# Patient Record
Sex: MALE | Race: WHITE | NOT HISPANIC OR LATINO | Employment: FULL TIME | URBAN - METROPOLITAN AREA
[De-identification: names, ages, dates, MRNs, and addresses within clinical notes are randomized per-mention and may not be internally consistent; named-entity substitution may affect disease eponyms.]

---

## 2018-09-28 ENCOUNTER — OFFICE VISIT (OUTPATIENT)
Dept: FAMILY MEDICINE | Facility: CLINIC | Age: 33
End: 2018-09-28
Payer: COMMERCIAL

## 2018-09-28 VITALS
DIASTOLIC BLOOD PRESSURE: 73 MMHG | BODY MASS INDEX: 29.26 KG/M2 | HEIGHT: 71 IN | WEIGHT: 209 LBS | OXYGEN SATURATION: 98 % | TEMPERATURE: 97.8 F | HEART RATE: 63 BPM | RESPIRATION RATE: 14 BRPM | SYSTOLIC BLOOD PRESSURE: 122 MMHG

## 2018-09-28 DIAGNOSIS — Z00.00 ROUTINE GENERAL MEDICAL EXAMINATION AT A HEALTH CARE FACILITY: Primary | ICD-10-CM

## 2018-09-28 LAB
ANION GAP SERPL CALCULATED.3IONS-SCNC: 7 MMOL/L (ref 3–14)
BUN SERPL-MCNC: 11 MG/DL (ref 7–30)
CALCIUM SERPL-MCNC: 9.2 MG/DL (ref 8.5–10.1)
CHLORIDE SERPL-SCNC: 102 MMOL/L (ref 94–109)
CHOLEST SERPL-MCNC: 226 MG/DL
CO2 SERPL-SCNC: 31 MMOL/L (ref 20–32)
CREAT SERPL-MCNC: 0.9 MG/DL (ref 0.66–1.25)
GFR SERPL CREATININE-BSD FRML MDRD: >90 ML/MIN/1.7M2
GLUCOSE SERPL-MCNC: 88 MG/DL (ref 70–99)
HDLC SERPL-MCNC: 53 MG/DL
LDLC SERPL CALC-MCNC: 155 MG/DL
NONHDLC SERPL-MCNC: 173 MG/DL
POTASSIUM SERPL-SCNC: 4.5 MMOL/L (ref 3.4–5.3)
SODIUM SERPL-SCNC: 140 MMOL/L (ref 133–144)
TRIGL SERPL-MCNC: 91 MG/DL

## 2018-09-28 PROCEDURE — 99385 PREV VISIT NEW AGE 18-39: CPT | Performed by: PHYSICIAN ASSISTANT

## 2018-09-28 PROCEDURE — 80061 LIPID PANEL: CPT | Performed by: PHYSICIAN ASSISTANT

## 2018-09-28 PROCEDURE — 36415 COLL VENOUS BLD VENIPUNCTURE: CPT | Performed by: PHYSICIAN ASSISTANT

## 2018-09-28 PROCEDURE — 80048 BASIC METABOLIC PNL TOTAL CA: CPT | Performed by: PHYSICIAN ASSISTANT

## 2018-09-28 NOTE — PROGRESS NOTES
SUBJECTIVE:   CC: David Diaz is an 33 year old male who presents for preventative health visit.     Physical   Annual:     Getting at least 3 servings of Calcium per day:  Yes    Bi-annual eye exam:  Yes    Dental care twice a year:  Yes    Sleep apnea or symptoms of sleep apnea:  None    Diet:  Regular (no restrictions)    Frequency of exercise:  1 day/week    Duration of exercise:  Less than 15 minutes    Taking medications regularly:  Yes    Medication side effects:  Not applicable    Additional concerns today:  No      Today's PHQ-2 Score:   PHQ-2 ( 1999 Pfizer) 9/28/2018   Q1: Little interest or pleasure in doing things 0   Q2: Feeling down, depressed or hopeless 0   PHQ-2 Score 0   Q1: Little interest or pleasure in doing things Not at all   Q2: Feeling down, depressed or hopeless Not at all   PHQ-2 Score 0       Abuse: Current or Past(Physical, Sexual or Emotional)- No  Do you feel safe in your environment - Yes    Social History   Substance Use Topics     Smoking status: Former Smoker     Smokeless tobacco: Never Used     Alcohol use No     Alcohol Use 9/28/2018   If you drink alcohol do you typically have greater than 3 drinks per day OR greater than 7 drinks per week? Not Applicable   No flowsheet data found.    Last PSA: No results found for: PSA    Reviewed orders with patient. Reviewed health maintenance and updated orders accordingly - Yes  Labs reviewed in EPIC  BP Readings from Last 3 Encounters:   09/28/18 122/73   08/05/11 120/68    Wt Readings from Last 3 Encounters:   09/28/18 209 lb (94.8 kg)   08/05/11 205 lb 6.4 oz (93.2 kg)                  Patient Active Problem List   Diagnosis     CARDIOVASCULAR SCREENING; LDL GOAL LESS THAN 160     Past Surgical History:   Procedure Laterality Date     VASECTOMY Bilateral 12/28/2018       Social History   Substance Use Topics     Smoking status: Former Smoker     Smokeless tobacco: Never Used     Alcohol use No     History reviewed. No pertinent  "family history.      No current outpatient prescriptions on file.     No Known Allergies  No lab results found.     Reviewed and updated as needed this visit by clinical staff  Tobacco  Allergies  Meds  Problems  Med Hx  Surg Hx  Fam Hx  Soc Hx          Reviewed and updated as needed this visit by Provider  Allergies  Meds  Problems          Review of Systems  CONSTITUTIONAL: NEGATIVE for fever, chills, change in weight  ENT: NEGATIVE for ear, mouth and throat problems  RESP: NEGATIVE for significant cough or SOB  CV: NEGATIVE for chest pain, palpitations or peripheral edema  GI: NEGATIVE for nausea, abdominal pain, heartburn, or change in bowel habits   male: negative for dysuria, hematuria, decreased urinary stream, erectile dysfunction, urethral discharge  MUSCULOSKELETAL: NEGATIVE for significant arthralgias or myalgia  PSYCHIATRIC: NEGATIVE for changes in mood or affect    OBJECTIVE:   /73 (Cuff Size: Adult Large)  Pulse 63  Temp 97.8  F (36.6  C) (Oral)  Resp 14  Ht 5' 10.75\" (1.797 m)  Wt 209 lb (94.8 kg)  SpO2 98%  BMI 29.36 kg/m2    Physical Exam  GENERAL: healthy, alert and no distress  EYES: Eyes grossly normal to inspection, PERRL and conjunctivae and sclerae normal  HENT: ear canals and TM's normal, nose and mouth without ulcers or lesions  NECK: no adenopathy, no asymmetry, masses, or scars and thyroid normal to palpation  RESP: lungs clear to auscultation - no rales, rhonchi or wheezes  CV: regular rate and rhythm, normal S1 S2, no S3 or S4, no murmur, click or rub, no peripheral edema and peripheral pulses strong  ABDOMEN: soft, nontender, no hepatosplenomegaly, no masses and bowel sounds normal  MS: no gross musculoskeletal defects noted, no edema  PSYCH: mentation appears normal, affect normal/bright    Diagnostic Test Results:  No results found for this or any previous visit.     ASSESSMENT/PLAN:       ICD-10-CM    1. Routine general medical examination at a health [facility] " "facility Z00.00 Lipid panel reflex to direct LDL Fasting     Basic metabolic panel     Work on Healthy diet and exercise. Getting heart rate elevated for 30 mins most days of week.     COUNSELING:   Reviewed preventive health counseling, as reflected in patient instructions       Healthy diet/nutrition    BP Readings from Last 1 Encounters:   09/28/18 122/73     Estimated body mass index is 29.36 kg/(m^2) as calculated from the following:    Height as of this encounter: 5' 10.75\" (1.797 m).    Weight as of this encounter: 209 lb (94.8 kg).    BP Screening:   Last 3 BP Readings:    BP Readings from Last 3 Encounters:   09/28/18 122/73   08/05/11 120/68       The following was recommended to the patient:  Re-screen BP within a year and recommended lifestyle modifications  Weight management plan: Discussed healthy diet and exercise guidelines and patient will follow up in 12 months in clinic to re-evaluate.     reports that he has quit smoking. He has never used smokeless tobacco.      Counseling Resources:  ATP IV Guidelines  Pooled Cohorts Equation Calculator  FRAX Risk Assessment  ICSI Preventive Guidelines  Dietary Guidelines for Americans, 2010  USDA's MyPlate  ASA Prophylaxis  Lung CA Screening    Darrius MCKEES  The student acted as a scribe and the encounter documented above was completely performed by myself and the documentation reflects the work I have performed today.   Gaetano Delgado PA-C  [facility]  "

## 2018-09-28 NOTE — PATIENT INSTRUCTIONS
Preventive Health Recommendations  Male Ages 26 - 39    Yearly exam:             See your health care provider every year in order to  o   Review health changes.   o   Discuss preventive care.    o   Review your medicines if your doctor has prescribed any.    You should be tested each year for STDs (sexually transmitted diseases), if you re at risk.     After age 35, talk to your provider about cholesterol testing. If you are at risk for heart disease, have your cholesterol tested at least every 5 years.     If you are at risk for diabetes, you should have a diabetes test (fasting glucose).  Shots: Get a flu shot each year. Get a tetanus shot every 10 years.     Nutrition:    Eat at least 5 servings of fruits and vegetables daily.     Eat whole-grain bread, whole-wheat pasta and brown rice instead of white grains and rice.     Get adequate Calcium and Vitamin D.     Lifestyle    Exercise for at least 150 minutes a week (30 minutes a day, 5 days a week). This will help you control your weight and prevent disease.     Limit alcohol to one drink per day.     No smoking.     Wear sunscreen to prevent skin cancer.     See your dentist every six months for an exam and cleaning.      Joselo Murphy), Cardiology; Interventional Cardiology  [address]  [address] [place] [address]  Phone: [phone]  Fax: [phone]    Johnny Barboza), Cardiac Electrophysiology; Cardiovascular Disease; Internal Medicine  [address]  [address] [place] [address]  Phone: [phone]  Fax: [phone]    Robby Michel), Cardiovascular Disease; Internal Medicine; Nuclear Cardiology  [address] [place] [address]  Phone: [phone]  Fax: [phone]

## 2018-09-28 NOTE — MR AVS SNAPSHOT
After Visit Summary   9/28/2018    David Diaz    MRN: [id number]           Patient Information     Date Of Birth          1985        Visit Information        Provider Department      9/28/2018 8:40 AM Gaetano Delgado PA-C [facility]        Today's Diagnoses     Routine general medical examination at a health care facility    -  1      Care Instructions      Preventive Health Recommendations  Male Ages 26 - 39    Yearly exam:             See your health care provider every year in order to  o   Review health changes.   o   Discuss preventive care.    o   Review your medicines if your doctor has prescribed any.    You should be tested each year for STDs (sexually transmitted diseases), if you re at risk.     After age 35, talk to your provider about cholesterol testing. If you are at risk for heart disease, have your cholesterol tested at least every 5 years.     If you are at risk for diabetes, you should have a diabetes test (fasting glucose).  Shots: Get a flu shot each year. Get a tetanus shot every 10 years.     Nutrition:    Eat at least 5 servings of fruits and vegetables daily.     Eat whole-grain bread, whole-wheat pasta and brown rice instead of white grains and rice.     Get adequate Calcium and Vitamin D.     Lifestyle    Exercise for at least 150 minutes a week (30 minutes a day, 5 days a week). This will help you control your weight and prevent disease.     Limit alcohol to one drink per day.     No smoking.     Wear sunscreen to prevent skin cancer.     See your dentist every six months for an exam and cleaning.             Follow-ups after your visit        Who to contact     If you have questions or need follow up information about today's clinic visit or your schedule please contact [facility] directly at [phone].  Normal or non-critical lab and imaging results will be communicated to you by MyChart, letter or phone within 4 business  "days after the clinic has received the results. If you do not hear from us within 7 days, please contact the clinic through GoPagohart or phone. If you have a critical or abnormal lab result, we will notify you by phone as soon as possible.  Submit refill requests through [employer] or call your pharmacy and they will forward the refill request to us. Please allow 3 business days for your refill to be completed.          Additional Information About Your Visit        Care EveryWhere ID     This is your Care EveryWhere ID. This could be used by other organizations to access your [facility] medical records  [id number]        Your Vitals Were     Pulse Temperature Respirations Height Pulse Oximetry BMI (Body Mass Index)    63 97.8  F (36.6  C) (Oral) 14 5' 10.75\" (1.797 m) 98% 29.36 kg/m2       Blood Pressure from Last 3 Encounters:   09/28/18 122/73   08/05/11 120/68    Weight from Last 3 Encounters:   09/28/18 209 lb (94.8 kg)   08/05/11 205 lb 6.4 oz (93.2 kg)              We Performed the Following     Basic metabolic panel     Lipid panel reflex to direct LDL Fasting        Primary Care Provider Office Phone # Fax #    Gaetano Delgado PA-C [phone] [address] [facility] [address]        Equal Access to Services     MARYLU PACK : Hadii aad ku hadasho Soomaali, waaxda luqadaha, qaybta kaalmada adeegyada, waxdanni idiin hayaan lucia cherry . So [facility] [phone].    ATENCIÓN: Si habla español, tiene a king disposición servicios gratuitos de asistencia lingüística. Llame al [phone].    We comply with applicable federal civil rights laws and [place] laws. We do not discriminate on the basis of race, color, national origin, age, disability, sex, sexual orientation, or gender identity.            Thank you!     Thank you for choosing [facility]  for your care. Our goal is always to provide you with excellent care. Hearing back from our patients is one way we can " continue to improve our services. Please take a few minutes to complete the written survey that you may receive in the mail after your visit with us. Thank you!             Your Updated Medication List - Protect others around you: Learn how to safely use, store and throw away your medicines at www.disposemymeds.org.      Notice  As of 9/28/2018  9:41 AM    You have not been prescribed any medications.

## 2018-09-28 NOTE — NURSING NOTE
"Chief Complaint   Patient presents with     Physical       Initial /73 (Cuff Size: Adult Large)  Pulse 63  Temp 97.8  F (36.6  C) (Oral)  Resp 14  Ht 5' 10.75\" (1.797 m)  Wt 209 lb (94.8 kg)  SpO2 98%  BMI 29.36 kg/m2 Estimated body mass index is 29.36 kg/(m^2) as calculated from the following:    Height as of this encounter: 5' 10.75\" (1.797 m).    Weight as of this encounter: 209 lb (94.8 kg).      Maida Barragan LPN    "

## 2018-09-28 NOTE — LETTER
September 28, 2018    David Diaz  [address] [facility] [address]            Mr. Diaz,     All of your labs were normal for you.     Please contact the clinic if you have additional questions.  Thank you.     Sincerely,     Gaetano Delgado PA-C/josé miguel    Results for orders placed or performed in visit on 09/28/18   Lipid panel reflex to direct LDL Fasting   Result Value Ref Range    Cholesterol 226 (H) <200 mg/dL    Triglycerides 91 <150 mg/dL    HDL Cholesterol 53 >39 mg/dL    LDL Cholesterol Calculated 155 (H) <100 mg/dL    Non HDL Cholesterol 173 (H) <130 mg/dL   Basic metabolic panel   Result Value Ref Range    Sodium 140 133 - 144 mmol/L    Potassium 4.5 3.4 - 5.3 mmol/L    Chloride 102 94 - 109 mmol/L    Carbon Dioxide 31 20 - 32 mmol/L    Anion Gap 7 3 - 14 mmol/L    Glucose 88 70 - 99 mg/dL    Urea Nitrogen 11 7 - 30 mg/dL    Creatinine 0.90 0.66 - 1.25 mg/dL    GFR Estimate >90 >60 mL/min/1.7m2    GFR Estimate If Black >90 >60 mL/min/1.7m2    Calcium 9.2 8.5 - 10.1 mg/dL

## 2018-09-28 NOTE — PROGRESS NOTES
MrHa Diaz,    All of your labs were normal for you.    Please contact the clinic if you have additional questions.  Thank you.    Sincerely,    Gaetano Delgado PA-C

## 2019-09-25 ENCOUNTER — OFFICE VISIT (OUTPATIENT)
Dept: FAMILY MEDICINE | Facility: CLINIC | Age: 34
End: 2019-09-25
Payer: COMMERCIAL

## 2019-09-25 VITALS
BODY MASS INDEX: 28.14 KG/M2 | DIASTOLIC BLOOD PRESSURE: 73 MMHG | RESPIRATION RATE: 16 BRPM | HEIGHT: 71 IN | HEART RATE: 74 BPM | WEIGHT: 201 LBS | TEMPERATURE: 98 F | SYSTOLIC BLOOD PRESSURE: 113 MMHG | OXYGEN SATURATION: 99 %

## 2019-09-25 DIAGNOSIS — Z13.6 CARDIOVASCULAR SCREENING; LDL GOAL LESS THAN 160: ICD-10-CM

## 2019-09-25 DIAGNOSIS — E66.3 OVERWEIGHT (BMI 25.0-29.9): ICD-10-CM

## 2019-09-25 DIAGNOSIS — F10.11 HISTORY OF ALCOHOL ABUSE: ICD-10-CM

## 2019-09-25 DIAGNOSIS — Z23 NEED FOR IMMUNIZATION AGAINST INFLUENZA: ICD-10-CM

## 2019-09-25 DIAGNOSIS — Z00.00 ROUTINE GENERAL MEDICAL EXAMINATION AT A HEALTH CARE FACILITY: Primary | ICD-10-CM

## 2019-09-25 LAB
CHOLEST SERPL-MCNC: 206 MG/DL
GLUCOSE SERPL-MCNC: 86 MG/DL (ref 70–99)
HDLC SERPL-MCNC: 57 MG/DL
LDLC SERPL CALC-MCNC: 134 MG/DL
NONHDLC SERPL-MCNC: 149 MG/DL
TRIGL SERPL-MCNC: 76 MG/DL

## 2019-09-25 PROCEDURE — 90471 IMMUNIZATION ADMIN: CPT | Performed by: NURSE PRACTITIONER

## 2019-09-25 PROCEDURE — 90686 IIV4 VACC NO PRSV 0.5 ML IM: CPT | Performed by: NURSE PRACTITIONER

## 2019-09-25 PROCEDURE — 80061 LIPID PANEL: CPT | Performed by: NURSE PRACTITIONER

## 2019-09-25 PROCEDURE — 82947 ASSAY GLUCOSE BLOOD QUANT: CPT | Performed by: NURSE PRACTITIONER

## 2019-09-25 PROCEDURE — 36415 COLL VENOUS BLD VENIPUNCTURE: CPT | Performed by: NURSE PRACTITIONER

## 2019-09-25 PROCEDURE — 99395 PREV VISIT EST AGE 18-39: CPT | Mod: 25 | Performed by: NURSE PRACTITIONER

## 2019-09-25 ASSESSMENT — MIFFLIN-ST. JEOR: SCORE: 1860.92

## 2019-09-25 ASSESSMENT — PAIN SCALES - GENERAL: PAINLEVEL: NO PAIN (0)

## 2019-09-25 NOTE — PATIENT INSTRUCTIONS
Preventive Health Recommendations  Male Ages 26 - 39    Yearly exam:             See your health care provider every year in order to  o   Review health changes.   o   Discuss preventive care.    o   Review your medicines if your doctor has prescribed any.    You should be tested each year for STDs (sexually transmitted diseases), if you re at risk.     After age 35, talk to your provider about cholesterol testing. If you are at risk for heart disease, have your cholesterol tested at least every 5 years.     If you are at risk for diabetes, you should have a diabetes test (fasting glucose).  Shots: Get a flu shot each year. Get a tetanus shot every 10 years.     Nutrition:    Eat at least 5 servings of fruits and vegetables daily.     Eat whole-grain bread, whole-wheat pasta and brown rice instead of white grains and rice.     Get adequate Calcium and Vitamin D.     Lifestyle    Exercise for at least 150 minutes a week (30 minutes a day, 5 days a week). This will help you control your weight and prevent disease.     Limit alcohol to one drink per day.     No smoking.     Wear sunscreen to prevent skin cancer.     See your dentist every six months for an exam and cleaning.     At [facility], we strive to deliver an exceptional experience to you, every time we see you.  If you receive a survey in the mail, please send us back your thoughts. We really do value your feedback.    Based on your medical history, these are the current health maintenance/preventive care services that you are due for (some may have been done at this visit.)  Health Maintenance Due   Topic Date Due     HIV SCREENING  02/20/1999     PHQ-2  01/01/2019     INFLUENZA VACCINE (1) 09/01/2019     PREVENTIVE CARE VISIT  09/28/2019         Suggested websites for health information:  Www.[employer].org : Up to date and easily searchable information on multiple topics.  Www.medlineplus.gov : medication info, interactive tutorials,  watch real surgeries online  Www.familydoctor.org : good info from the Academy of Family Physicians  Www.cdc.gov : public health info, travel advisories, epidemics (H1N1)  Www.aap.org : children's health info, normal development, vaccinations  Www.health.[facility].[place].us : [place] dept of health, public health issues in [place], N1N1    Your care team:                            Family Medicine Internal Medicine   MD Yohannes García MD Shantel Branch-Fleming, MD Katya Georgiev PA-C Nam Ho, MD Pediatrics   BETI Roberts, MEGHAN Capellan APRN CNP   MD Mercedes Melendez MD Deborah Mielke, MD Kim Thein, APRN CNP      Clinic hours: Monday - Thursday 7 am-7 pm; Fridays 7 am-5 pm.   Urgent care: Monday - Friday 11 am-9 pm; Saturday and Sunday 9 am-5 pm.  Pharmacy : Monday -Thursday 8 am-8 pm; Friday 8 am-6 pm; Saturday and Sunday 9 am-5 pm.     Clinic: [phone]   Pharmacy: [phone]    Patient Education     Prevention Guidelines, Men Ages 18 to 39  Screening tests and vaccines are an important part of managing your health. A screening test is done to find possible disorders or diseases in people who don't have any symptoms. The goal is to find a disease early so lifestyle changes can be made and you can be watched more closely to reduce the risk of disease, or to detect it early enough to treat it most effectively. Screening tests are not considered diagnostic, but are used to determine if more testing is needed. Health counseling is essential, too. Below are guidelines for these, for men ages 18 to 39. Talk with your healthcare provider to make sure you re up-to-date on what you need.  Screening Who needs it How often   Alcohol misuse All men in this age group At routine exams   Blood pressure All men in this age group Yearly checkup if your blood pressure is normal  Normal blood pressure is less than 120/80  If your blood pressure is higher than normal, follow  the advice of your healthcare provider.     All men in this age group At routine exams   Diabetes mellitus, type 2 All men who have no symptoms but are overweight or obese and have 1 or more other risk factors for diabetes At least every 3 years (yearly if blood sugar has already started to rise)   Diabetes mellitus, type 2  All men with prediabetes Every year   Hepatitis C If at increased risk At routine exams   High cholesterol or triglycerides All men ages 35 and older, and younger men at high risk for coronary artery disease At least every 5 years   HIV All men At routine exams   Obesity All men in this age group At routine exams   Syphilis Men at increased risk for infection - talk with your healthcare provider At routine exams   Tuberculosis Men at increased risk for infection - talk with your healthcare provider Check with your healthcare provider   Vision All men in this age group Every 5 to 10 years if no risk factors for eye disease   Vaccines Who needs it How often   Chickenpox (varicella) All men in this age group who have no record of this infection or vaccine 2 doses; the second dose should be given at least 4 weeks after the first dose   Hepatitis A Men at increased risk for infection - talk with your healthcare provider 2 doses given at least 6 months apart   Hepatitis B Men at increased risk for infection - talk with your healthcare provider 3 doses over 6 months; second dose should be given 1 month after the first dose; the third dose should be given at least 2 months after the second dose and at least 4 months after the first dose   Haemophilus influenzae Type B (HIB) Men at increased risk for infection - talk with your healthcare provider 1 to 3 doses   Human papillomavirus (HPV) All men in this age group up to age 26 3 doses; the second dose should be given 1 to 2 months after the first dose and the third dose given 6 months after the first dose   Influenza (flu) All men in this age group Once a  year   Measles, mumps, rubella (MMR) All men in this age group who have no record of these infections or vaccines 1 or 2 doses through age 55   Meningococcal Men at increased risk for infection - talk with your healthcare provider 1 or more doses   Pneumococca (PCV13) and Pneumococcal (PPSV23) Men at increased risk for infection - talk with your healthcare provider PCV13: 1 dose ages 19 to 65 (protects against 13 types of pneumococcal bacteria)  PPSV23: 1 to 2 doses through age 64, or 1 dose at 65 or older (protects against 23 types of pneumococcal bacteria)   Tetanus/diphtheria/pertussis (Td/Tdap) booster All men in this age group A one-time Tdap booster after age 18, then Td every10 years   Counseling Who needs it How often   Diet and exercise Overweight or obese people When diagnosed, and then at routine exams   Use of tobacco and the health effects it can cause All men in this age group Every visit   Sexually transmitted infection prevention Men who are sexually active At routine exams   Skin cancer Prevention of skin cancer in fair-skinned adults through age 24 At routine exams   1Those who are 18 years of age, who are not up-to-date on their childhood immunizations, should receive all appropriate catch-up vaccines recommended by the CDC.  Date Last Reviewed: 2/1/2017 2000-2018 [employer]. [address] [place] [address], [place] [address]. All rights reserved. This information is not intended as a substitute for professional medical care. Always follow your healthcare professional's instructions.

## 2019-09-25 NOTE — PROGRESS NOTES
SUBJECTIVE:   CC: David Diaz is an 34 year old male who presents for preventive health visit.     Healthy Habits:    Do you get at least three servings of calcium containing foods daily (dairy, green leafy vegetables, etc.)? yes    Amount of exercise or daily activities, outside of work: None    Problems taking medications regularly not applicable    Medication side effects: No    Have you had an eye exam in the past two years? yes    Do you see a dentist twice per year? no    Do you have sleep apnea, excessive snoring or daytime drowsiness?no  Patient also brings in biometric screening forms for completion.      Today's PHQ-2 Score:   PHQ-2 ( 1999 [employer]) 9/25/2019 9/28/2018   Q1: Little interest or pleasure in doing things 0 0   Q2: Feeling down, depressed or hopeless 0 0   PHQ-2 Score 0 0   Q1: Little interest or pleasure in doing things - Not at all   Q2: Feeling down, depressed or hopeless - Not at all   PHQ-2 Score - 0       Abuse: Current or Past(Physical, Sexual or Emotional)- No  Do you feel safe in your environment? Yes    Social History     Tobacco Use     Smoking status: Former Smoker     Smokeless tobacco: Never Used   Substance Use Topics     Alcohol use: No     If you drink alcohol do you typically have >3 drinks per day or >7 drinks per week? No                      Last PSA: No results found for: PSA    Reviewed orders with patient. Reviewed health maintenance and updated orders accordingly - Yes  Labs reviewed in EPIC  BP Readings from Last 3 Encounters:   09/25/19 113/73   09/28/18 122/73   08/05/11 120/68    Wt Readings from Last 3 Encounters:   09/25/19 91.2 kg (201 lb)   09/28/18 94.8 kg (209 lb)   08/05/11 93.2 kg (205 lb 6.4 oz)                  Patient Active Problem List   Diagnosis     CARDIOVASCULAR SCREENING; LDL GOAL LESS THAN 160     Past Surgical History:   Procedure Laterality Date     VASECTOMY Bilateral 12/28/2018       Social History     Tobacco Use     Smoking status:  "Former Smoker     Smokeless tobacco: Never Used   Substance Use Topics     Alcohol use: No     Family History   Problem Relation Age of Onset     Breast Cancer Mother      Lung Cancer Maternal Grandfather      Substance Abuse Maternal Grandfather         alcohol     Diabetes No family hx of      Coronary Artery Disease No family hx of      Hypertension No family hx of      Hyperlipidemia No family hx of      Colon Cancer No family hx of      Prostate Cancer No family hx of      Thyroid Disease No family hx of      Asthma No family hx of            Reviewed and updated as needed this visit by clinical staff  Tobacco  Allergies  Meds         Reviewed and updated as needed this visit by Provider        History reviewed. No pertinent past medical history.   Past Surgical History:   Procedure Laterality Date     VASECTOMY Bilateral 12/28/2018       ROS:  CONSTITUTIONAL: NEGATIVE for fever, chills, change in weight  INTEGUMENTARY/SKIN: NEGATIVE for worrisome rashes, moles or lesions  EYES: NEGATIVE for vision changes or irritation  ENT: NEGATIVE for ear, mouth and throat problems  RESP: NEGATIVE for significant cough or SOB  CV: NEGATIVE for chest pain, palpitations or peripheral edema  GI: NEGATIVE for nausea, abdominal pain, heartburn, or change in bowel habits   male: negative for dysuria, hematuria, decreased urinary stream, erectile dysfunction, urethral discharge  MUSCULOSKELETAL: NEGATIVE for significant arthralgias or myalgia  NEURO: NEGATIVE for weakness, dizziness or paresthesias  ENDOCRINE: NEGATIVE for temperature intolerance, skin/hair changes  PSYCHIATRIC: NEGATIVE for changes in mood or affect    OBJECTIVE:   /73 (BP Location: Left arm, Patient Position: Chair, Cuff Size: Adult Regular)   Pulse 74   Temp 98  F (36.7  C) (Oral)   Resp 16   Ht 1.791 m (5' 10.5\")   Wt 91.2 kg (201 lb)   SpO2 99%   BMI 28.43 kg/m    EXAM:  GENERAL: healthy, alert and no distress  EYES: Eyes grossly normal to " "inspection, PERRL and conjunctivae and sclerae normal  HENT: ear canals and TM's normal, nose and mouth without ulcers or lesions  NECK: no adenopathy, no asymmetry, masses, or scars and thyroid normal to palpation  RESP: lungs clear to auscultation - no rales, rhonchi or wheezes  CV: regular rate and rhythm, normal S1 S2, no S3 or S4, no murmur, click or rub, no peripheral edema and peripheral pulses strong  ABDOMEN: soft, nontender, no hepatosplenomegaly, no masses and bowel sounds normal   (male): normal male genitalia without lesions or urethral discharge, no hernia  MS: no gross musculoskeletal defects noted, no edema  SKIN: no suspicious lesions or rashes  NEURO: Normal strength and tone, mentation intact and speech normal  PSYCH: mentation appears normal, affect normal/bright  LYMPH: no cervical, supraclavicular, axillary, or inguinal adenopathy    Diagnostic Test Results:  Labs reviewed in Epic  No results found for this or any previous visit (from the past 24 hour(s)).    ASSESSMENT/PLAN:   1. Routine general medical examination at a health care facility    - Glucose    2. History of alcohol abuse  Sober X 9 years and he attends AA periodically.    3. CARDIOVASCULAR SCREENING; LDL GOAL LESS THAN 160    - Lipid Profile (Chol, Trig, HDL, LDL calc)    4. Overweight (BMI 25.0-25.9)   Benefits of weight loss reviewed in detail, encouraged him to cut back on the carbohydrates in the diet, consume more fruits and vegetables, drink plenty of water, avoid fruit juices, sodas, get 150 min moderate exercise/week.  Recheck weight in 6 months.    5. Need for immunization against influenza        COUNSELING:  Reviewed preventive health counseling, as reflected in patient instructions    Estimated body mass index is 28.43 kg/m  as calculated from the following:    Height as of this encounter: 1.791 m (5' 10.5\").    Weight as of this encounter: 91.2 kg (201 lb).    Weight management plan: Discussed healthy diet and " exercise guidelines     reports that he has quit smoking. He has never used smokeless tobacco.      Counseling Resources:  ATP IV Guidelines  Pooled Cohorts Equation Calculator  FRAX Risk Assessment  ICSI Preventive Guidelines  Dietary Guidelines for Americans, 2010  USDA's MyPlate  ASA Prophylaxis  Lung CA Screening    SAUNDRA Franklin [facility]

## 2019-09-25 NOTE — LETTER
October 3, 2019      David Diaz  [address] [address]        Dear David,         Your cholesterol was abnormal. Genetics, diet, weight and low exercise levels can contribute to this.  Elevated LDL cholesterol and triglycerides as well as low HDL cholesterol all increase a person's risk for heart and vascular disease.     I recommend you start a low fat and low cholesterol diet, weight loss and regular exercise to see if you can't improve this a bit.  Recheck in 12 months.     Your blood sugar was normal for you.     Feel free to contact me with any questions or concerns.  Thank you for allowing me to participate in your care.         Roma Miller APRN, CNP/rl     Resulted Orders   Glucose   Result Value Ref Range    Glucose 86 70 - 99 mg/dL      Comment:      Fasting specimen   Lipid Profile (Chol, Trig, HDL, LDL calc)   Result Value Ref Range    Cholesterol 206 (H) <200 mg/dL      Comment:      Desirable:       <200 mg/dl    Triglycerides 76 <150 mg/dL      Comment:      Fasting specimen    HDL Cholesterol 57 >39 mg/dL    LDL Cholesterol Calculated 134 (H) <100 mg/dL      Comment:      Above desirable:  100-129 mg/dl  Borderline High:  130-159 mg/dL  High:             160-189 mg/dL  Very high:       >189 mg/dl      Non HDL Cholesterol 149 (H) <130 mg/dL      Comment:      Above Desirable:  130-159 mg/dl  Borderline high:  160-189 mg/dl  High:             190-219 mg/dl  Very high:       >219 mg/dl

## 2019-09-26 NOTE — PROGRESS NOTES
Biometric form completed.  pls fax as requested and send original to patient.  Roma ARGUELLO, CNP

## 2019-09-26 NOTE — PROGRESS NOTES
Faxed signed form to [employer] service, [phone], right fax confirmed at 2:46 pm today, 9/26/19. Copy to TC and abstracting.  Yin Ly MA   [facility]  [facility]nd [facility]

## 2020-10-09 ENCOUNTER — NURSE TRIAGE (OUTPATIENT)
Dept: FAMILY MEDICINE | Facility: CLINIC | Age: 35
End: 2020-10-09

## 2020-10-09 NOTE — TELEPHONE ENCOUNTER
Reason for call:  Patient reporting a symptom    Symptom or request: Leg pain, calf and thigh    Duration (how long have symptoms been present): Unknown    Have you been treated for this before? Yes    Additional comments: Pt would like to speak with a nurse.    Phone Number patient can be reached at:  Home number on file [phone] (home)    Best Time:  Anytime    Can we leave a detailed message on this number:  NO    Call taken on 10/9/2020 at 12:41 PM by Brook Carrasco
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
## 2020-10-27 NOTE — PROGRESS NOTES
SUBJECTIVE:   CC: David Diaz is an 35 year old male who presents for preventative health visit.       Patient has been advised of split billing requirements and indicates understanding: Yes  Healthy Habits:     Getting at least 3 servings of Calcium per day:  Yes    Bi-annual eye exam:  Yes    Dental care twice a year:  Yes    Sleep apnea or symptoms of sleep apnea:  None    Diet:  Regular (no restrictions)    Frequency of exercise:  6-7 days/week    Duration of exercise:  15-30 minutes    Taking medications regularly:  Yes    Barriers to taking medications:  Not applicable    Medication side effects:  None    PHQ-2 Total Score: 0    Additional concerns today:  No      Today's PHQ-2 Score:   PHQ-2 ( 1999 Pfizer) 10/28/2020   Q1: Little interest or pleasure in doing things 0   Q2: Feeling down, depressed or hopeless 0   PHQ-2 Score 0   Q1: Little interest or pleasure in doing things Not at all   Q2: Feeling down, depressed or hopeless Not at all   PHQ-2 Score 0       Abuse: Current or Past(Physical, Sexual or Emotional)- No  Do you feel safe in your environment? Yes        Social History     Tobacco Use     Smoking status: Former Smoker     Smokeless tobacco: Never Used   Substance Use Topics     Alcohol use: No     If you drink alcohol do you typically have >3 drinks per day or >7 drinks per week? No    Alcohol Use 10/28/2020   Prescreen: >3 drinks/day or >7 drinks/week? Not Applicable   Prescreen: >3 drinks/day or >7 drinks/week? -       Last PSA: No results found for: PSA    Reviewed orders with patient. Reviewed health maintenance and updated orders accordingly - Yes  BP Readings from Last 3 Encounters:   10/28/20 110/70   09/25/19 113/73   09/28/18 122/73    Wt Readings from Last 3 Encounters:   10/28/20 81.6 kg (179 lb 12.8 oz)   09/25/19 91.2 kg (201 lb)   09/28/18 94.8 kg (209 lb)                  Patient Active Problem List   Diagnosis     CARDIOVASCULAR SCREENING; LDL GOAL LESS THAN 160     History of  alcohol abuse     Varicose veins of right lower extremity with pain     Past Surgical History:   Procedure Laterality Date     VASECTOMY Bilateral 12/28/2018       Social History     Tobacco Use     Smoking status: Former Smoker     Smokeless tobacco: Never Used   Substance Use Topics     Alcohol use: No     Family History   Problem Relation Age of Onset     Breast Cancer Mother      Lung Cancer Maternal Grandfather      Substance Abuse Maternal Grandfather         alcohol     Diabetes No family hx of      Coronary Artery Disease No family hx of      Hypertension No family hx of      Hyperlipidemia No family hx of      Colon Cancer No family hx of      Prostate Cancer No family hx of      Thyroid Disease No family hx of      Asthma No family hx of          No current outpatient medications on file.     No Known Allergies    Reviewed and updated as needed this visit by clinical staff  Tobacco  Allergies  Meds   Med Hx  Surg Hx  Fam Hx  Soc Hx        Reviewed and updated as needed this visit by Provider                  Review of Systems   Constitutional: Negative for chills and fever.   HENT: Negative for congestion, ear pain, hearing loss and sore throat.    Eyes: Negative for pain and visual disturbance.   Respiratory: Negative for cough and shortness of breath.    Cardiovascular: Negative for chest pain, palpitations and peripheral edema.   Gastrointestinal: Negative for abdominal pain, constipation, diarrhea, heartburn, hematochezia and nausea.   Genitourinary: Negative for discharge, dysuria, frequency, genital sores, hematuria, impotence and urgency.   Musculoskeletal: Negative for arthralgias, joint swelling and myalgias.   Skin: Negative for rash.   Neurological: Negative for dizziness, weakness, headaches and paresthesias.   Psychiatric/Behavioral: Negative for mood changes. The patient is not nervous/anxious.      OBJECTIVE:   /70   Pulse 72   Temp 98.9  F (37.2  C)   Resp 12   Ht 1.778 m  "(5' 10\")   Wt 81.6 kg (179 lb 12.8 oz)   SpO2 98%   BMI 25.80 kg/m      Physical Exam  GENERAL: healthy, alert and no distress  EYES: Eyes grossly normal to inspection, PERRL and conjunctivae and sclerae normal  HENT: ear canals and TM's normal, nose and mouth without ulcers or lesions  NECK: no adenopathy, no asymmetry, masses, or scars and thyroid normal to palpation  RESP: lungs clear to auscultation - no rales, rhonchi or wheezes  CV: regular rate and rhythm, normal S1 S2, no S3 or S4, no murmur, click or rub, no peripheral edema and peripheral pulses strong  ABDOMEN: soft, nontender, no hepatosplenomegaly, no masses and bowel sounds normal   (male): Patient declined  MS: no gross musculoskeletal defects noted, no edema  SKIN: no suspicious lesions or rashes and Varicose veins right lower extremity  NEURO: Normal strength and tone, mentation intact and speech normal  PSYCH: mentation appears normal, affect normal/bright    Diagnostic Test Results:  Labs reviewed in Epic  No results found for this or any previous visit (from the past 24 hour(s)).    ASSESSMENT/PLAN:       ICD-10-CM    1. Routine general medical examination at a health care facility  Z00.00    2. Screening for diabetes mellitus  Z13.1 Glucose   3. CARDIOVASCULAR SCREENING; LDL GOAL LESS THAN 160  Z13.6 Lipid panel reflex to direct LDL Fasting   4. Varicose veins of right lower extremity with pain  I83.811 VASCULAR SURGERY REFERRAL     - Referral placed for Varicose Veins from hx of trauma, causing intermittent pain and discomfort.     Patient has been advised of split billing requirements and indicates understanding: Yes  COUNSELING:   Reviewed preventive health counseling, as reflected in patient instructions  Special attention given to:        Regular exercise       Healthy diet/nutrition       Vision screening       Immunizations - Up to date       HIV screeninx in teen years, 1x in adult years, and at intervals if high risk - " "Declined       Hepatitis C screening - Declined    Estimated body mass index is 25.8 kg/m  as calculated from the following:    Height as of this encounter: 1.778 m (5' 10\").    Weight as of this encounter: 81.6 kg (179 lb 12.8 oz).     Weight management plan: Discussed healthy diet and exercise guidelines    He reports that he has quit smoking. He has never used smokeless tobacco.      Counseling Resources:  ATP IV Guidelines  Pooled Cohorts Equation Calculator  FRAX Risk Assessment  ICSI Preventive Guidelines  Dietary Guidelines for Americans, 2010  USDA's MyPlate  ASA Prophylaxis  Lung CA Screening    Monika Khan PA-C  [facility]  "

## 2020-10-28 ENCOUNTER — OFFICE VISIT (OUTPATIENT)
Dept: FAMILY MEDICINE | Facility: OTHER | Age: 35
End: 2020-10-28
Payer: COMMERCIAL

## 2020-10-28 ENCOUNTER — TELEPHONE (OUTPATIENT)
Dept: OTHER | Facility: CLINIC | Age: 35
End: 2020-10-28

## 2020-10-28 VITALS
DIASTOLIC BLOOD PRESSURE: 70 MMHG | OXYGEN SATURATION: 98 % | HEIGHT: 70 IN | TEMPERATURE: 98.9 F | BODY MASS INDEX: 25.74 KG/M2 | HEART RATE: 72 BPM | SYSTOLIC BLOOD PRESSURE: 110 MMHG | WEIGHT: 179.8 LBS | RESPIRATION RATE: 12 BRPM

## 2020-10-28 DIAGNOSIS — Z00.00 ROUTINE GENERAL MEDICAL EXAMINATION AT A HEALTH CARE FACILITY: Primary | ICD-10-CM

## 2020-10-28 DIAGNOSIS — Z13.1 SCREENING FOR DIABETES MELLITUS: ICD-10-CM

## 2020-10-28 DIAGNOSIS — I83.811 VARICOSE VEINS OF RIGHT LOWER EXTREMITY WITH PAIN: ICD-10-CM

## 2020-10-28 DIAGNOSIS — Z13.6 CARDIOVASCULAR SCREENING; LDL GOAL LESS THAN 160: ICD-10-CM

## 2020-10-28 LAB
CHOLEST SERPL-MCNC: 201 MG/DL
GLUCOSE SERPL-MCNC: 89 MG/DL (ref 70–99)
HDLC SERPL-MCNC: 68 MG/DL
LDLC SERPL CALC-MCNC: 119 MG/DL
NONHDLC SERPL-MCNC: 133 MG/DL
TRIGL SERPL-MCNC: 70 MG/DL

## 2020-10-28 PROCEDURE — 80061 LIPID PANEL: CPT | Performed by: PHYSICIAN ASSISTANT

## 2020-10-28 PROCEDURE — 90471 IMMUNIZATION ADMIN: CPT | Performed by: PHYSICIAN ASSISTANT

## 2020-10-28 PROCEDURE — 90686 IIV4 VACC NO PRSV 0.5 ML IM: CPT | Performed by: PHYSICIAN ASSISTANT

## 2020-10-28 PROCEDURE — 82947 ASSAY GLUCOSE BLOOD QUANT: CPT | Performed by: PHYSICIAN ASSISTANT

## 2020-10-28 PROCEDURE — 36415 COLL VENOUS BLD VENIPUNCTURE: CPT | Performed by: PHYSICIAN ASSISTANT

## 2020-10-28 PROCEDURE — 99395 PREV VISIT EST AGE 18-39: CPT | Mod: 25 | Performed by: PHYSICIAN ASSISTANT

## 2020-10-28 ASSESSMENT — ENCOUNTER SYMPTOMS
NERVOUS/ANXIOUS: 0
EYE PAIN: 0
MYALGIAS: 0
JOINT SWELLING: 0
COUGH: 0
DIARRHEA: 0
DYSURIA: 0
WEAKNESS: 0
DIZZINESS: 0
CONSTIPATION: 0
HEARTBURN: 0
ABDOMINAL PAIN: 0
SORE THROAT: 0
FREQUENCY: 0
CHILLS: 0
FEVER: 0
HEMATOCHEZIA: 0
HEADACHES: 0
PARESTHESIAS: 0
SHORTNESS OF BREATH: 0
NAUSEA: 0
ARTHRALGIAS: 0
PALPITATIONS: 0
HEMATURIA: 0

## 2020-10-28 ASSESSMENT — PAIN SCALES - GENERAL: PAINLEVEL: NO PAIN (0)

## 2020-10-28 ASSESSMENT — MIFFLIN-ST. JEOR: SCORE: 1751.82

## 2020-10-28 NOTE — TELEPHONE ENCOUNTER
"Referral received via EPIC \"Work Que\", per  guidelines referral forwarded to [facility].   Reason for referral: varicose veins    Yasmine THOMASN, RN    [facility]  Office: [phone]  Fax: [phone]          "

## 2021-01-07 ENCOUNTER — OFFICE VISIT (OUTPATIENT)
Dept: VASCULAR SURGERY | Facility: CLINIC | Age: 36
End: 2021-01-07
Attending: PHYSICIAN ASSISTANT
Payer: COMMERCIAL

## 2021-01-07 DIAGNOSIS — I83.811 VARICOSE VEINS OF RIGHT LOWER EXTREMITY WITH PAIN: Primary | ICD-10-CM

## 2021-01-07 PROCEDURE — 99203 OFFICE O/P NEW LOW 30 MIN: CPT | Performed by: SURGERY
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
## 2021-01-11 ENCOUNTER — ANCILLARY PROCEDURE (OUTPATIENT)
Dept: ULTRASOUND IMAGING | Facility: CLINIC | Age: 36
End: 2021-01-11
Attending: SURGERY
Payer: COMMERCIAL

## 2021-01-11 DIAGNOSIS — I83.811 VARICOSE VEINS OF RIGHT LOWER EXTREMITY WITH PAIN: ICD-10-CM

## 2021-01-11 PROCEDURE — 93971 EXTREMITY STUDY: CPT | Mod: RT | Performed by: SURGERY

## 2021-01-15 ENCOUNTER — VIRTUAL VISIT (OUTPATIENT)
Dept: VASCULAR SURGERY | Facility: CLINIC | Age: 36
End: 2021-01-15
Attending: SURGERY
Payer: COMMERCIAL

## 2021-01-15 DIAGNOSIS — I83.811 VARICOSE VEINS OF RIGHT LOWER EXTREMITY WITH PAIN: Primary | ICD-10-CM

## 2021-01-15 PROCEDURE — 99213 OFFICE O/P EST LOW 20 MIN: CPT | Mod: GT | Performed by: SURGERY

## 2021-01-15 NOTE — LETTER
1/15/2021         RE: David Diaz  [address]  [place] [address]        Dear Colleague,    Thank you for referring your patient, David Diaz, to the [facility] [place]. Please see a copy of my visit note below.    David is a 35 year old who is being evaluated via a billable video visit.      How would you like to obtain your AVS? MyChart  If the video visit is dropped, the invitation should be resent by: Text to cell phone: [phone]  Will anyone else be joining your video visit? No      Video Start Time: 8:41 AM       Video-Visit Details    Type of service:  Video Visit    Video End Time:9:00 AM    Originating Location (pt. Location): Home    Distant Location (provider location):  [facility] [place]     Platform used for Video Visit: [facility]     [facility] Clinic Note    David Diaz presents in follow-up of right lower extremity pain, swelling and varicose veins.  This has been complicated by a pruritic, stasis dermatitis several months ago which he scratched to the point of causing scabs to form.  Please see my consultation of 1/7/2021 for details.  He returned on 1/11/2021 for a right lower extremity venous competency study, the results of which we will discuss on today's video visit.    Physical Exam  General: Pleasant male in no acute distress.  Blood pressure 132/66, pulse 80  Extremities: Right lower extremity: 4 to 6 mm varicosities over the medial and posterior medial right calf extending down the posterior calf.  A dilated vein is noted in the distal posterior leg overlying the small saphenous vein.  No stasis hyperpigmentation or dermatitis on today's visit.  No edema.    Ultrasound:  Right lower extremity: No evidence of deep vein thrombosis.  His right common femoral through popliteal veins are incompetent.  His right great saphenous vein is incompetent from the saphenofemoral junction to the mid calf with the exception of  2 skip areas, measures 4.7 mm in diameter with reflux time of 5.6 seconds.  It is a source of multiple incompetent vein branches measuring up to 4.4 mm in diameter with reflux time of 1.6 seconds.  The right small saphenous vein is incompetent, measures 6.5 mm in diameter with reflux time of 4.2 seconds.  It is a source of multiple incompetent vein branches measuring up to 4 mm in diameter with reflux time of 3.7 seconds.  The right Vein of Giacomini is not visualized.  The right anterior accessory saphenous vein is competent.  There are 2 3.1 mm diameter incompetent perforators on the posterior calf 13 cm and 15 cm cephalad of the lateral malleolus.  These communicate with incompetent vein branches.    The right common femoral vein demonstrates normal phasicity, compression and augmentation with no evidence of deep vein thrombosis.    Assessment:  C2/3, VCSS 4 right lower extremity chronic venous insufficiency.  We discussed the anatomy and pathophysiology of venous insufficiency once again.  I discussed his ultrasound in detail using a drawing of the lower extremity veins, pointing out all the areas of abnormalities.    We discussed the option of continued conservative measures with risks of superficial thrombophlebitis, bleeding and progression of the disease process.  He has not pursued conservative measures adequately at this time as he is not had an adequate trial of compression therapy.  We discussed the absolute necessity of wearing compression on a daily basis.    He inquired as to what he might do to help mitigate the vein abnormalities and I shared that exercise, weight control and compression of the mainstays of conservative management.    We briefly discussed options for treating his problem which would entail endovenous ablation of his right great saphenous and small saphenous veins with or without phlebectomies.  Details of the procedure including risks of bleeding, infection, nerve injury, scarring,  hyperpigmentation, deep vein thrombosis, recanalization of the great saphenous vein and recurrent varicose veins were discussed.  He voiced understanding and his questions were answered.    Plan:  Follow-up conservative management in about 3 months via a video visit.    Joni Bautista MD    Dictated using Dragon voice recognition software which may result in transcription errors            Again, thank you for allowing me to participate in the care of your patient.        Sincerely,        Joni Bautista MD

## 2021-01-15 NOTE — PROGRESS NOTES
David is a 35 year old who is being evaluated via a billable video visit.      How would you like to obtain your AVS? MyChart  If the video visit is dropped, the invitation should be resent by: Text to cell phone: [phone]  Will anyone else be joining your video visit? No      Video Start Time: 8:41 AM       Video-Visit Details    Type of service:  Video Visit    Video End Time:9:00 AM    Originating Location (pt. Location): Home    Distant Location (provider location):  [facility] [place]     Platform used for Video Visit: [facility]     [facility] Clinic Note    David Diaz presents in follow-up of right lower extremity pain, swelling and varicose veins.  This has been complicated by a pruritic, stasis dermatitis several months ago which he scratched to the point of causing scabs to form.  Please see my consultation of 1/7/2021 for details.  He returned on 1/11/2021 for a right lower extremity venous competency study, the results of which we will discuss on today's video visit.    Physical Exam  General: Pleasant male in no acute distress.  Blood pressure 132/66, pulse 80  Extremities: Right lower extremity: 4 to 6 mm varicosities over the medial and posterior medial right calf extending down the posterior calf.  A dilated vein is noted in the distal posterior leg overlying the small saphenous vein.  No stasis hyperpigmentation or dermatitis on today's visit.  No edema.    Ultrasound:  Right lower extremity: No evidence of deep vein thrombosis.  His right common femoral through popliteal veins are incompetent.  His right great saphenous vein is incompetent from the saphenofemoral junction to the mid calf with the exception of 2 skip areas, measures 4.7 mm in diameter with reflux time of 5.6 seconds.  It is a source of multiple incompetent vein branches measuring up to 4.4 mm in diameter with reflux time of 1.6 seconds.  The right small saphenous vein is incompetent, measures 6.5 mm  in diameter with reflux time of 4.2 seconds.  It is a source of multiple incompetent vein branches measuring up to 4 mm in diameter with reflux time of 3.7 seconds.  The right Vein of Giacomini is not visualized.  The right anterior accessory saphenous vein is competent.  There are 2 3.1 mm diameter incompetent perforators on the posterior calf 13 cm and 15 cm cephalad of the lateral malleolus.  These communicate with incompetent vein branches.    The right common femoral vein demonstrates normal phasicity, compression and augmentation with no evidence of deep vein thrombosis.    Assessment:  C2/3, VCSS 4 right lower extremity chronic venous insufficiency.  We discussed the anatomy and pathophysiology of venous insufficiency once again.  I discussed his ultrasound in detail using a drawing of the lower extremity veins, pointing out all the areas of abnormalities.    We discussed the option of continued conservative measures with risks of superficial thrombophlebitis, bleeding and progression of the disease process.  He has not pursued conservative measures adequately at this time as he is not had an adequate trial of compression therapy.  We discussed the absolute necessity of wearing compression on a daily basis.    He inquired as to what he might do to help mitigate the vein abnormalities and I shared that exercise, weight control and compression of the mainstays of conservative management.    We briefly discussed options for treating his problem which would entail endovenous ablation of his right great saphenous and small saphenous veins with or without phlebectomies.  Details of the procedure including risks of bleeding, infection, nerve injury, scarring, hyperpigmentation, deep vein thrombosis, recanalization of the great saphenous vein and recurrent varicose veins were discussed.  He voiced understanding and his questions were answered.    Plan:  Follow-up conservative management in about 3 months via a video  visit.    Joni Bautista MD    Dictated using Dragon voice recognition software which may result in transcription errors

## 2021-04-15 ENCOUNTER — VIRTUAL VISIT (OUTPATIENT)
Dept: VASCULAR SURGERY | Facility: CLINIC | Age: 36
End: 2021-04-15
Payer: COMMERCIAL

## 2021-04-15 DIAGNOSIS — I83.811 VARICOSE VEINS OF RIGHT LOWER EXTREMITY WITH PAIN: Primary | ICD-10-CM

## 2021-04-15 PROCEDURE — 99213 OFFICE O/P EST LOW 20 MIN: CPT | Mod: 95 | Performed by: SURGERY

## 2021-04-15 NOTE — PROGRESS NOTES
David is a 36 year old who is being evaluated via a billable video visit.      How would you like to obtain your AVS? MyChart  If the video visit is dropped, the invitation should be resent by: Text to cell phone: [phone]  Will anyone else be joining your video visit? No      Video Start Time: 3:02 PM    Video-Visit Details    Type of service:  Video Visit    Video End Time:3:16 PM    Originating Location (pt. Location): Home    Distant Location (provider location):  [facility] [place]     Platform used for Video Visit: [employer]     [facility] [place] Progress Note    David Diaz presents in follow-up of right lower extremity pain and varicose veins. Please see my consultation of 1/7/2021 and subsequent visit on 1/15/2021 for details. He has been pursuing conservative management with compression, leg elevation, dietary measures and exercise now for more than 3 months and returns in follow-up of conservative treatment.    The patient states that there are occasions where his calf feels fairly well with compression but other times when he has to pull them down because of pruritus and pain from the stockings and salves. He does not feel this is going to be a good long-term option, especially since he sweats easily and is getting pruritus from the stocking as well.    Physical Exam  General: Pleasant male in no acute distress.   Right lower extremity: 4 to 6 mm varicosities over the medial and posterior medial right calf extending down the posterior calf.  A dilated vein is noted in the distal posterior leg overlying the small saphenous vein.  No stasis hyperpigmentation or dermatitis on today's visit.  No edema.    Ultrasound:  Previously has shown deep and great saphenous vein incompetence in his right lower extremity. Right great saphenous vein and small saphenous vein are sources of varicose veins on his right medial calf and his symptoms.    Assessment:  Failed  conservative management of right lower extremity chronic venous insufficiency. He is now pursued conservative measures for more than 3 months but does not seem to be getting adequate, durable relief of his symptoms. He feels that the symptoms interfere with his actives of daily living making it difficult for him to sit or  one place for long periods of time because of the pain in the right medial calf.    We discussed the option of continued conservative measures with use of compression hose, leg elevation, dietary measures and exercise with risk of superficial thrombophlebitis, bleeding and progression of the disease process.    If he chose treatment, he be candidate for endovenous ablation of the right great saphenous and small saphenous veins with and without phlebectomies. Details of procedure including risks of bleeding, infection, nerve injury, scarring, hyperpigmentation, deep vein thrombosis, recanalization of the great saphenous vein and recurrent varicose veins were discussed. He voiced understanding and his questions were answered.    Plan:  Radiofrequency ablation right great saphenous and small saphenous veins with 10-20 medically necessary phlebectomies.    Joni Bautista MD    Dictated using Dragon voice recognition software which may result in transcription errors

## 2021-04-15 NOTE — LETTER
4/15/2021         RE: David Diaz  [address]  [place] [address]        Dear Colleague,    Thank you for referring your patient, David Diaz, to the [facility]. Please see a copy of my visit note below.    David is a 36 year old who is being evaluated via a billable video visit.      How would you like to obtain your AVS? MyChart  If the video visit is dropped, the invitation should be resent by: Text to cell phone: [phone]  Will anyone else be joining your video visit? No      Video Start Time: 3:02 PM    Video-Visit Details    Type of service:  Video Visit    Video End Time:3:16 PM    Originating Location (pt. Location): Home    Distant Location (provider location):  [facility]     Platform used for Video Visit: [facility]     [facility] Progress Note    David Diaz presents in follow-up of right lower extremity pain and varicose veins. Please see my consultation of 1/7/2021 and subsequent visit on 1/15/2021 for details. He has been pursuing conservative management with compression, leg elevation, dietary measures and exercise now for more than 3 months and returns in follow-up of conservative treatment.    The patient states that there are occasions where his calf feels fairly well with compression but other times when he has to pull them down because of pruritus and pain from the stockings and salves. He does not feel this is going to be a good long-term option, especially since he sweats easily and is getting pruritus from the stocking as well.    Physical Exam  General: Pleasant male in no acute distress.   Right lower extremity: 4 to 6 mm varicosities over the medial and posterior medial right calf extending down the posterior calf.  A dilated vein is noted in the distal posterior leg overlying the small saphenous vein.  No stasis hyperpigmentation or dermatitis on today's visit.  No  edema.    Ultrasound:  Previously has shown deep and great saphenous vein incompetence in his right lower extremity. Right great saphenous vein and small saphenous vein are sources of varicose veins on his right medial calf and his symptoms.    Assessment:  Failed conservative management of right lower extremity chronic venous insufficiency. He is now pursued conservative measures for more than 3 months but does not seem to be getting adequate, durable relief of his symptoms. He feels that the symptoms interfere with his actives of daily living making it difficult for him to sit or  one place for long periods of time because of the pain in the right medial calf.    We discussed the option of continued conservative measures with use of compression hose, leg elevation, dietary measures and exercise with risk of superficial thrombophlebitis, bleeding and progression of the disease process.    If he chose treatment, he be candidate for endovenous ablation of the right great saphenous and small saphenous veins with and without phlebectomies. Details of procedure including risks of bleeding, infection, nerve injury, scarring, hyperpigmentation, deep vein thrombosis, recanalization of the great saphenous vein and recurrent varicose veins were discussed. He voiced understanding and his questions were answered.    Plan:  Radiofrequency ablation right great saphenous and small saphenous veins with 10-20 medically necessary phlebectomies.    Joni Bautista MD    Dictated using Dragon voice recognition software which may result in transcription errors              Again, thank you for allowing me to participate in the care of your patient.        Sincerely,        Joni Bautista MD

## 2021-04-15 NOTE — Clinical Note
Radiofrequency ablation right great saphenous and small saphenous veins with 10-20 medically necessary phlebectomies. 2 hours for procedure.  (If the patient does not wish to have phlebectomies, short the procedure to 1 hour.)

## 2021-10-02 ENCOUNTER — HEALTH MAINTENANCE LETTER (OUTPATIENT)
Age: 36
End: 2021-10-02

## 2021-11-26 ENCOUNTER — E-VISIT (OUTPATIENT)
Dept: FAMILY MEDICINE | Facility: CLINIC | Age: 36
End: 2021-11-26
Payer: COMMERCIAL

## 2021-11-26 DIAGNOSIS — F39 MOOD DISORDER (H): Primary | ICD-10-CM

## 2021-11-26 PROCEDURE — 99207 PR NON-BILLABLE SERV PER CHARTING: CPT | Performed by: PHYSICIAN ASSISTANT

## 2021-11-26 ASSESSMENT — PATIENT HEALTH QUESTIONNAIRE - PHQ9
SUM OF ALL RESPONSES TO PHQ QUESTIONS 1-9: 14
SUM OF ALL RESPONSES TO PHQ QUESTIONS 1-9: 14
10. IF YOU CHECKED OFF ANY PROBLEMS, HOW DIFFICULT HAVE THESE PROBLEMS MADE IT FOR YOU TO DO YOUR WORK, TAKE CARE OF THINGS AT HOME, OR GET ALONG WITH OTHER PEOPLE: EXTREMELY DIFFICULT

## 2021-11-26 ASSESSMENT — ANXIETY QUESTIONNAIRES
8. IF YOU CHECKED OFF ANY PROBLEMS, HOW DIFFICULT HAVE THESE MADE IT FOR YOU TO DO YOUR WORK, TAKE CARE OF THINGS AT HOME, OR GET ALONG WITH OTHER PEOPLE?: VERY DIFFICULT
7. FEELING AFRAID AS IF SOMETHING AWFUL MIGHT HAPPEN: NOT AT ALL
1. FEELING NERVOUS, ANXIOUS, OR ON EDGE: NEARLY EVERY DAY
2. NOT BEING ABLE TO STOP OR CONTROL WORRYING: SEVERAL DAYS
GAD7 TOTAL SCORE: 12
3. WORRYING TOO MUCH ABOUT DIFFERENT THINGS: NEARLY EVERY DAY
4. TROUBLE RELAXING: MORE THAN HALF THE DAYS
7. FEELING AFRAID AS IF SOMETHING AWFUL MIGHT HAPPEN: NOT AT ALL
GAD7 TOTAL SCORE: 12
GAD7 TOTAL SCORE: 12
6. BECOMING EASILY ANNOYED OR IRRITABLE: NEARLY EVERY DAY
5. BEING SO RESTLESS THAT IT IS HARD TO SIT STILL: NOT AT ALL

## 2021-11-27 ASSESSMENT — ANXIETY QUESTIONNAIRES: GAD7 TOTAL SCORE: 12

## 2021-11-27 ASSESSMENT — PATIENT HEALTH QUESTIONNAIRE - PHQ9: SUM OF ALL RESPONSES TO PHQ QUESTIONS 1-9: 14

## 2021-11-30 NOTE — PROGRESS NOTES
Assessment & Plan       ICD-10-CM    1. Anxiety  F41.9 sertraline (ZOLOFT) 50 MG tablet   2. Mild recurrent major depression (H)  F33.0 sertraline (ZOLOFT) 50 MG tablet   3. High priority for 2019-nCoV vaccine  Z23 COVID-19,PF,MODERNA (18+ Yrs Primary Series .5mL)     Talk to patient about his concerns.  We will have him continue counseling.  We talked about starting Zoloft.  Warning signs side effects were discussed.  We will recheck again in about 4 weeks.    Return in about 4 weeks (around 12/29/2021) for recheck.    Gaetano Delgado PA-C  [facility]    Michael Hernandez is a 36 year old who presents for the following health issues     History of Present Illness       Mental Health Follow-up:  Patient presents to follow-up on Depression & Anxiety.Patient's depression since last visit has been:  Worse  The patient is having other symptoms associated with depression.  Patient's anxiety since last visit has been:  Worse  The patient is having other symptoms associated with anxiety.  Any significant life events: No  Patient is feeling anxious or having panic attacks.  Patient has no concerns about alcohol or drug use.     Social History  Tobacco Use    Smoking status: Former Smoker    Smokeless tobacco: Never Used  Vaping Use    Vaping Use: Never used  Alcohol use: No  Drug use: Yes    Types: Marijuana      Today's PHQ-9         PHQ-9 Total Score:     (P) 17   PHQ-9 Q9 Thoughts of better off dead/self-harm past 2 weeks :   (P) Not at all   Thoughts of suicide or self harm:      Self-harm Plan:        Self-harm Action:          Safety concerns for self or others:           He eats 4 or more servings of fruits and vegetables daily.He consumes 0 sweetened beverage(s) daily.He exercises with enough effort to increase his heart rate 20 to 29 minutes per day.  He exercises with enough effort to increase his heart rate 4 days per week.   He is taking medications regularly.     Trouble  focusing, possible adhd. No testing done yet. Has masters degree.   Increased stressed - wondering about possible depression or anxiety   Had done counseling in the past for a few years   Tx: none, over the counter CBD.     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 Any drug or alcohol at that time.     Review of Systems   Constitutional, HEENT, cardiovascular, pulmonary, gi and gu systems are negative, except as otherwise noted.      Objective    /83   Pulse 77   Temp 98.1  F (36.7  C) (Oral)   Resp 17   Wt 80.7 kg (178 lb)   SpO2 98%   BMI 25.54 kg/m    Body mass index is 25.54 kg/m .  Physical Exam   GENERAL: healthy, alert and no distress  PSYCH: mentation appears normal, affect normal/bright       Answers for HPI/ROS submitted by the patient on 12/1/2021  If you checked off any problems, how difficult have these problems made it for you to do your work, take care of things at home, or get along with other people?: Extremely difficult  PHQ9 TOTAL SCORE: 17  FAWN 7 TOTAL SCORE: 19

## 2021-12-01 ENCOUNTER — OFFICE VISIT (OUTPATIENT)
Dept: FAMILY MEDICINE | Facility: CLINIC | Age: 36
End: 2021-12-01
Payer: COMMERCIAL

## 2021-12-01 VITALS
HEART RATE: 77 BPM | SYSTOLIC BLOOD PRESSURE: 136 MMHG | OXYGEN SATURATION: 98 % | WEIGHT: 178 LBS | TEMPERATURE: 98.1 F | BODY MASS INDEX: 25.48 KG/M2 | RESPIRATION RATE: 17 BRPM | HEIGHT: 70 IN | DIASTOLIC BLOOD PRESSURE: 83 MMHG

## 2021-12-01 DIAGNOSIS — F41.9 ANXIETY: Primary | ICD-10-CM

## 2021-12-01 DIAGNOSIS — Z23 HIGH PRIORITY FOR 2019-NCOV VACCINE: ICD-10-CM

## 2021-12-01 DIAGNOSIS — F33.0 MILD RECURRENT MAJOR DEPRESSION (H): ICD-10-CM

## 2021-12-01 PROCEDURE — 0011A COVID-19,PF,MODERNA (18+ YRS PRIMARY SERIES .5ML): CPT | Performed by: PHYSICIAN ASSISTANT

## 2021-12-01 PROCEDURE — 99213 OFFICE O/P EST LOW 20 MIN: CPT | Performed by: PHYSICIAN ASSISTANT

## 2021-12-01 PROCEDURE — 91301 COVID-19,PF,MODERNA (18+ YRS PRIMARY SERIES .5ML): CPT | Performed by: PHYSICIAN ASSISTANT

## 2021-12-01 PROCEDURE — 96127 BRIEF EMOTIONAL/BEHAV ASSMT: CPT | Performed by: PHYSICIAN ASSISTANT

## 2021-12-01 ASSESSMENT — ANXIETY QUESTIONNAIRES
6. BECOMING EASILY ANNOYED OR IRRITABLE: NEARLY EVERY DAY
GAD7 TOTAL SCORE: 19
1. FEELING NERVOUS, ANXIOUS, OR ON EDGE: NEARLY EVERY DAY
4. TROUBLE RELAXING: NEARLY EVERY DAY
5. BEING SO RESTLESS THAT IT IS HARD TO SIT STILL: NEARLY EVERY DAY
2. NOT BEING ABLE TO STOP OR CONTROL WORRYING: NEARLY EVERY DAY
GAD7 TOTAL SCORE: 19
3. WORRYING TOO MUCH ABOUT DIFFERENT THINGS: MORE THAN HALF THE DAYS
7. FEELING AFRAID AS IF SOMETHING AWFUL MIGHT HAPPEN: MORE THAN HALF THE DAYS
8. IF YOU CHECKED OFF ANY PROBLEMS, HOW DIFFICULT HAVE THESE MADE IT FOR YOU TO DO YOUR WORK, TAKE CARE OF THINGS AT HOME, OR GET ALONG WITH OTHER PEOPLE?: EXTREMELY DIFFICULT
GAD7 TOTAL SCORE: 19
7. FEELING AFRAID AS IF SOMETHING AWFUL MIGHT HAPPEN: MORE THAN HALF THE DAYS

## 2021-12-01 ASSESSMENT — MIFFLIN-ST. JEOR: SCORE: 1742.62

## 2021-12-01 ASSESSMENT — PAIN SCALES - GENERAL: PAINLEVEL: NO PAIN (0)

## 2021-12-01 ASSESSMENT — PATIENT HEALTH QUESTIONNAIRE - PHQ9
SUM OF ALL RESPONSES TO PHQ QUESTIONS 1-9: 17
SUM OF ALL RESPONSES TO PHQ QUESTIONS 1-9: 17
10. IF YOU CHECKED OFF ANY PROBLEMS, HOW DIFFICULT HAVE THESE PROBLEMS MADE IT FOR YOU TO DO YOUR WORK, TAKE CARE OF THINGS AT HOME, OR GET ALONG WITH OTHER PEOPLE: EXTREMELY DIFFICULT

## 2021-12-02 ASSESSMENT — PATIENT HEALTH QUESTIONNAIRE - PHQ9: SUM OF ALL RESPONSES TO PHQ QUESTIONS 1-9: 17

## 2021-12-02 ASSESSMENT — ANXIETY QUESTIONNAIRES: GAD7 TOTAL SCORE: 19

## 2022-01-06 NOTE — PROGRESS NOTES
Assessment & Plan       ICD-10-CM    1. Anxiety  F41.9 sertraline (ZOLOFT) 50 MG tablet   2. Mild recurrent major depression (H)  F33.0 sertraline (ZOLOFT) 50 MG tablet   3. High priority for 2019-nCoV vaccine  Z23 COVID-19,PF,MODERNA (18+ Yrs Primary Series .5mL)   medical conditions are stable. meds refilled.  warning signs discussed.    Return in about 6 months (around 7/7/2022) for recheck.    Gaetano Delgado PA-C  [facility]   Stephon is a 36 year old who presents for the following health issues     History of Present Illness       Mental Health Follow-up:  Patient presents to follow-up on Depression & Anxiety.Patient's depression since last visit has been:  Better  The patient is not having other symptoms associated with depression.  Patient's anxiety since last visit has been:  Better  The patient is not having other symptoms associated with anxiety.  Any significant life events: No  Patient is feeling anxious or having panic attacks.  Patient has no concerns about alcohol or drug use.     Social History  Tobacco Use    Smoking status: Former Smoker    Smokeless tobacco: Never Used  Vaping Use    Vaping Use: Never used  Alcohol use: No  Drug use: Yes    Types: Marijuana      Today's PHQ-9         PHQ-9 Total Score:     (P) 8   PHQ-9 Q9 Thoughts of better off dead/self-harm past 2 weeks :   (P) Not at all   Thoughts of suicide or self harm:      Self-harm Plan:        Self-harm Action:          Safety concerns for self or others:          overall feeling 50% better and pleased with that.   Mood swings have improved and his irritability is improved. His focus is better but he still has some challenges with focus we like to give the medicine more time.  Review of Systems   Constitutional, HEENT, cardiovascular, pulmonary, gi and gu systems are negative, except as otherwise noted.      Objective    /79   Pulse 82   Temp 97.8  F (36.6  C) (Tympanic)   Resp 16    "Ht 1.784 m (5' 10.25\")   Wt 80.3 kg (177 lb)   SpO2 99%   BMI 25.22 kg/m    Body mass index is 25.22 kg/m .  Physical Exam   GENERAL: healthy, alert and no distress  PSYCH: mentation appears normal, affect normal/bright          Answers for HPI/ROS submitted by the patient on 1/7/2022  If you checked off any problems, how difficult have these problems made it for you to do your work, take care of things at home, or get along with other people?: Somewhat difficult  PHQ9 TOTAL SCORE: 8  FAWN 7 TOTAL SCORE: 6      "

## 2022-01-07 ENCOUNTER — OFFICE VISIT (OUTPATIENT)
Dept: FAMILY MEDICINE | Facility: CLINIC | Age: 37
End: 2022-01-07
Payer: COMMERCIAL

## 2022-01-07 VITALS
WEIGHT: 177 LBS | TEMPERATURE: 97.8 F | HEIGHT: 70 IN | SYSTOLIC BLOOD PRESSURE: 132 MMHG | HEART RATE: 82 BPM | RESPIRATION RATE: 16 BRPM | OXYGEN SATURATION: 99 % | BODY MASS INDEX: 25.34 KG/M2 | DIASTOLIC BLOOD PRESSURE: 79 MMHG

## 2022-01-07 DIAGNOSIS — F41.9 ANXIETY: Primary | ICD-10-CM

## 2022-01-07 DIAGNOSIS — F33.0 MILD RECURRENT MAJOR DEPRESSION (H): ICD-10-CM

## 2022-01-07 DIAGNOSIS — Z23 HIGH PRIORITY FOR 2019-NCOV VACCINE: ICD-10-CM

## 2022-01-07 PROCEDURE — 99213 OFFICE O/P EST LOW 20 MIN: CPT | Mod: 25 | Performed by: PHYSICIAN ASSISTANT

## 2022-01-07 PROCEDURE — 91301 COVID-19,PF,MODERNA (18+ YRS PRIMARY SERIES .5ML): CPT | Performed by: PHYSICIAN ASSISTANT

## 2022-01-07 PROCEDURE — 0012A COVID-19,PF,MODERNA (18+ YRS PRIMARY SERIES .5ML): CPT | Performed by: PHYSICIAN ASSISTANT

## 2022-01-07 ASSESSMENT — ANXIETY QUESTIONNAIRES
GAD7 TOTAL SCORE: 6
1. FEELING NERVOUS, ANXIOUS, OR ON EDGE: SEVERAL DAYS
3. WORRYING TOO MUCH ABOUT DIFFERENT THINGS: NOT AT ALL
GAD7 TOTAL SCORE: 6
5. BEING SO RESTLESS THAT IT IS HARD TO SIT STILL: MORE THAN HALF THE DAYS
7. FEELING AFRAID AS IF SOMETHING AWFUL MIGHT HAPPEN: NOT AT ALL
7. FEELING AFRAID AS IF SOMETHING AWFUL MIGHT HAPPEN: NOT AT ALL
2. NOT BEING ABLE TO STOP OR CONTROL WORRYING: SEVERAL DAYS
GAD7 TOTAL SCORE: 6
4. TROUBLE RELAXING: SEVERAL DAYS
6. BECOMING EASILY ANNOYED OR IRRITABLE: SEVERAL DAYS

## 2022-01-07 ASSESSMENT — PATIENT HEALTH QUESTIONNAIRE - PHQ9
SUM OF ALL RESPONSES TO PHQ QUESTIONS 1-9: 8
10. IF YOU CHECKED OFF ANY PROBLEMS, HOW DIFFICULT HAVE THESE PROBLEMS MADE IT FOR YOU TO DO YOUR WORK, TAKE CARE OF THINGS AT HOME, OR GET ALONG WITH OTHER PEOPLE: SOMEWHAT DIFFICULT
SUM OF ALL RESPONSES TO PHQ QUESTIONS 1-9: 8

## 2022-01-07 ASSESSMENT — MIFFLIN-ST. JEOR: SCORE: 1738.09

## 2022-01-07 ASSESSMENT — PAIN SCALES - GENERAL: PAINLEVEL: NO PAIN (0)

## 2022-01-07 NOTE — LETTER
My Depression Action Plan  Name: David Diaz   Date of Birth 1985  Date: 1/6/2022    My doctor: Gaetano Delgado   My clinic: [facility]  [address] [address] [phone]          GREEN    ZONE   Good Control    What it looks like:     Things are going generally well. You have normal ups and downs. You may even feel depressed from time to time, but bad moods usually last less than a day.   What you need to do:  1. Continue to care for yourself (see self care plan)  2. Check your depression survival kit and update it as needed  3. Follow your physician s recommendations including any medication.  4. Do not stop taking medication unless you consult with your physician first.           YELLOW         ZONE Getting Worse    What it looks like:     Depression is starting to interfere with your life.     It may be hard to get out of bed; you may be starting to isolate yourself from others.    Symptoms of depression are starting to last most all day and this has happened for several days.     You may have suicidal thoughts but they are not constant.   What you need to do:     1. Call your care team. Your response to treatment will improve if you keep your care team informed of your progress. Yellow periods are signs an adjustment may need to be made.     2. Continue your self-care.  Just get dressed and ready for the day.  Don't give yourself time to talk yourself out of it.    3. Talk to someone in your support network.    4. Open up your Depression Self-Care Plan/Wellness Kit.           RED    ZONE Medical Alert - Get Help    What it looks like:     Depression is seriously interfering with your life.     You may experience these or other symptoms: You can t get out of bed most days, can t work or engage in other necessary activities, you have trouble taking care of basic hygiene, or basic responsibilities, thoughts of suicide or death that will not  go away, self-injurious behavior.     What you need to do:  1. Call your care team and request a same-day appointment. If they are not available (weekends or after hours) call your local crisis line, emergency room or 911.          Depression Self-Care Plan / Wellness Kit    Many people find that medication and therapy are helpful treatments for managing depression. In addition, making small changes to your everyday life can help to boost your mood and improve your wellbeing. Below are some tips for you to consider. Be sure to talk with your medical provider and/or behavioral health consultant if your symptoms are worsening or not improving.     Sleep   Sleep hygiene  means all of the habits that support good, restful sleep. It includes maintaining a consistent bedtime and wake time, using your bedroom only for sleeping or sex, and keeping the bedroom dark and free of distractions like a computer, smartphone, or television.     Develop a Healthy Routine  Maintain good hygiene. Get out of bed in the morning, make your bed, brush your teeth, take a shower, and get dressed. Don t spend too much time viewing media that makes you feel stressed. Find time to relax each day.    Exercise  Get some form of exercise every day. This will help reduce pain and release endorphins, the  feel good  chemicals in your brain. It can be as simple as just going for a walk or doing some gardening, anything that will get you moving.      Diet  Strive to eat healthy foods, including fruits and vegetables. Drink plenty of water. Avoid excessive sugar, caffeine, alcohol, and other mood-altering substances.     Stay Connected with Others  Stay in touch with friends and family members.    Manage Your Mood  Try deep breathing, massage therapy, biofeedback, or meditation. Take part in fun activities when you can. Try to find something to smile about each day.     Psychotherapy  Be open to working with a therapist if your provider recommends it.      Medication  Be sure to take your medication as prescribed. Most anti-depressants need to be taken every day. It usually takes several weeks for medications to work. Not all medicines work for all people. It is important to follow-up with your provider to make sure you have a treatment plan that is working for you. Do not stop your medication abruptly without first discussing it with your provider.    Crisis Resources   These hotlines are for both adults and children. They and are open 24 hours a day, 7 days a week unless noted otherwise.      National Suicide Prevention Lifeline   2-880-774-TALK (4033)      Crisis Text Line    www.crisistextline.org  Text HOME to 617112 from anywhere in the [place], anytime, about any type of crisis. A live, trained crisis counselor will receive the text and respond quickly.      [facility] Lifeline for LGBTQ Youth  A national crisis intervention and suicide lifeline for LGBTQ youth under 25. Provides a safe place to talk without judgement. Call 1-857.825.7578; text START to 422520 or visit www.thetrevorproject.org to talk to a trained counselor.      For [facility] crisis numbers, visit the [facility] website at:  https://[place].gov/dhs/people-we-serve/adults/health-care/mental-health/resources/crisis-contacts.jsp

## 2022-01-08 ASSESSMENT — PATIENT HEALTH QUESTIONNAIRE - PHQ9: SUM OF ALL RESPONSES TO PHQ QUESTIONS 1-9: 8

## 2022-01-08 ASSESSMENT — ANXIETY QUESTIONNAIRES: GAD7 TOTAL SCORE: 6

## 2022-01-22 ENCOUNTER — HEALTH MAINTENANCE LETTER (OUTPATIENT)
Age: 37
End: 2022-01-22

## 2022-04-29 ENCOUNTER — TELEPHONE (OUTPATIENT)
Dept: FAMILY MEDICINE | Facility: CLINIC | Age: 37
End: 2022-04-29
Payer: COMMERCIAL

## 2022-04-29 NOTE — TELEPHONE ENCOUNTER
Pt started sertraline 50 mg in Jan.  He was feeling more even on it and sx resolved.  Now over the last few weeks it seems to be not as effective. Pt did have a minor panic attack yesterday. Today he is ruminating on thoughts.   Pt is not having any side effects.     Would you like to increase the dose?  Do you need an e visit for this?  Agnes THOMASN, RN

## 2022-05-05 ENCOUNTER — E-VISIT (OUTPATIENT)
Dept: FAMILY MEDICINE | Facility: CLINIC | Age: 37
End: 2022-05-05
Payer: COMMERCIAL

## 2022-05-05 DIAGNOSIS — F41.9 ANXIETY: Primary | ICD-10-CM

## 2022-05-05 PROCEDURE — 99207 PR NON-BILLABLE SERV PER CHARTING: CPT | Performed by: PHYSICIAN ASSISTANT

## 2022-05-10 DIAGNOSIS — F33.0 MILD RECURRENT MAJOR DEPRESSION (H): ICD-10-CM

## 2022-05-10 DIAGNOSIS — F41.9 ANXIETY: ICD-10-CM

## 2022-05-10 NOTE — TELEPHONE ENCOUNTER
"Next 5 appointments (look out 90 days)      May 12, 2022 12:30 PM  (Arrive by 12:10 PM)  Provider Visit with Gaetano Delgado PA-C  [facility] ([facility] ) [address] [facility] [address] [phone]          Requested Prescriptions   Pending Prescriptions Disp Refills    sertraline (ZOLOFT) 50 MG tablet 90 tablet 0     Sig: Take 1 tablet (50 mg) by mouth daily        SSRIs Protocol Failed - 5/10/2022  9:57 AM        Failed - PHQ-9 score less than 5 in past 6 months     Please review last PHQ-9 score.           Passed - Medication is active on med list        Passed - Patient is age 18 or older        Passed - Recent (6 mo) or future (30 days) visit within the authorizing provider's specialty     Patient had office visit in the last 6 months or has a visit in the next 30 days with authorizing provider or within the authorizing provider's specialty.  See \"Patient Info\" tab in inbasket, or \"Choose Columns\" in Meds & Orders section of the refill encounter.                  "

## 2022-05-10 NOTE — TELEPHONE ENCOUNTER
Reason for Call:  Medication or medication refill:    Do you use a [facility]?  Name of the pharmacy and phone number for the current request:  [id number] - [place], [place] - [address]    Name of the medication requested: Sertaline    Other request: Pt is out of meds as of today, he made a video visit with provider for Thursday, can provider refill his rx for the reg dose until his appt?    Can we leave a detailed message on this number? NO    Phone number patient can be reached at: Home number on file [phone] (home)    Best Time: anytime    Call taken on 5/10/2022 at 9:20 AM by Catalina Ferrer

## 2022-05-12 ENCOUNTER — VIRTUAL VISIT (OUTPATIENT)
Dept: FAMILY MEDICINE | Facility: CLINIC | Age: 37
End: 2022-05-12
Payer: COMMERCIAL

## 2022-05-12 DIAGNOSIS — F33.0 MILD RECURRENT MAJOR DEPRESSION (H): ICD-10-CM

## 2022-05-12 DIAGNOSIS — F41.9 ANXIETY: ICD-10-CM

## 2022-05-12 PROCEDURE — 99213 OFFICE O/P EST LOW 20 MIN: CPT | Mod: 95 | Performed by: PHYSICIAN ASSISTANT

## 2022-05-12 RX ORDER — SERTRALINE HYDROCHLORIDE 100 MG/1
100 TABLET, FILM COATED ORAL DAILY
Qty: 90 TABLET | Refills: 0 | Status: SHIPPED | OUTPATIENT
Start: 2022-05-12 | End: 2022-08-09

## 2022-05-12 ASSESSMENT — ANXIETY QUESTIONNAIRES
IF YOU CHECKED OFF ANY PROBLEMS ON THIS QUESTIONNAIRE, HOW DIFFICULT HAVE THESE PROBLEMS MADE IT FOR YOU TO DO YOUR WORK, TAKE CARE OF THINGS AT HOME, OR GET ALONG WITH OTHER PEOPLE: VERY DIFFICULT
5. BEING SO RESTLESS THAT IT IS HARD TO SIT STILL: MORE THAN HALF THE DAYS
2. NOT BEING ABLE TO STOP OR CONTROL WORRYING: MORE THAN HALF THE DAYS
1. FEELING NERVOUS, ANXIOUS, OR ON EDGE: SEVERAL DAYS
7. FEELING AFRAID AS IF SOMETHING AWFUL MIGHT HAPPEN: SEVERAL DAYS
6. BECOMING EASILY ANNOYED OR IRRITABLE: MORE THAN HALF THE DAYS
GAD7 TOTAL SCORE: 11
3. WORRYING TOO MUCH ABOUT DIFFERENT THINGS: MORE THAN HALF THE DAYS

## 2022-05-12 ASSESSMENT — PATIENT HEALTH QUESTIONNAIRE - PHQ9
SUM OF ALL RESPONSES TO PHQ QUESTIONS 1-9: 7
5. POOR APPETITE OR OVEREATING: SEVERAL DAYS

## 2022-05-12 NOTE — PROGRESS NOTES
Stephon is a 37 year old who is being evaluated via a billable video visit.      How would you like to obtain your AVS? MyChart  If the video visit is dropped, the invitation should be resent by: Text to cell phone: [phone]  Will anyone else be joining your video visit? No      Video Start Time: 12:04 PM    Assessment & Plan       ICD-10-CM    1. Anxiety  F41.9 sertraline (ZOLOFT) 100 MG tablet   2. Mild recurrent major depression (H)  F33.0 sertraline (ZOLOFT) 100 MG tablet     Talk to the patient through video visit regarding his concerns we will go ahead and increase his Zoloft dosage to 5 mg a day we will recheck things in 4weeks otherwise on a 6-month basis.  Warning signs side effects were discussed    Return in about 4 weeks (around 6/9/2022) for recheck.    Gaetano Delgado PA-C  [facility]   Stephon is a 37 year old who presents for the following health issues     HPI     Medication Followup of Zoloft    Taking Medication as prescribed: yes    Side Effects:  None    Medication Helping Symptoms:  yes     Depression and Anxiety Follow-Up    How are you doing with your depression since your last visit? Stable but has noticed some worsening symptoms off and on    How are you doing with your anxiety since your last visit?  Stable but has noticed some worsening symptoms off and on    Over the last month or 2.  No known cause.  He got a less stressful job and has been helpful.    Are you having other symptoms that might be associated with depression or anxiety? No    Have you had a significant life event? No     Do you have any concerns with your use of alcohol or other drugs? No    Social History     Tobacco Use     Smoking status: Former Smoker     Smokeless tobacco: Never Used   Vaping Use     Vaping Use: Never used   Substance Use Topics     Alcohol use: No     Drug use: Yes     Types: Marijuana     PHQ 12/1/2021 1/7/2022 5/12/2022   PHQ-9 Total Score 17 8 7   Q9:  Thoughts of better off dead/self-harm past 2 weeks Not at all Not at all Not at all     FAWN-7 SCORE 12/1/2021 1/7/2022 5/12/2022   Total Score 19 (severe anxiety) 6 (mild anxiety) -   Total Score 19 6 11         Review of Systems   Constitutional, HEENT, cardiovascular, pulmonary, gi and gu systems are negative, except as otherwise noted.      Objective           Vitals:  No vitals were obtained today due to virtual visit.    Physical Exam   GENERAL: Healthy, alert and no distress  EYES: Eyes grossly normal to inspection.  No discharge or erythema, or obvious scleral/conjunctival abnormalities.  RESP: No audible wheeze, cough, or visible cyanosis.  No visible retractions or increased work of breathing.    SKIN: Visible skin clear. No significant rash, abnormal pigmentation or lesions.  NEURO: Cranial nerves grossly intact.  Mentation and speech appropriate for age.  PSYCH: Mentation appears normal, affect normal/bright, judgement and insight intact, normal speech and appearance well-groomed.                Video-Visit Details    Type of service:  Video Visit    Video End Time:12:10 PM    Originating Location (pt. Location): Home    Distant Location (provider location):  [facility]     Platform used for Video Visit: [employer]

## 2022-05-13 ASSESSMENT — ANXIETY QUESTIONNAIRES: GAD7 TOTAL SCORE: 11

## 2022-08-06 DIAGNOSIS — F33.0 MILD RECURRENT MAJOR DEPRESSION (H): ICD-10-CM

## 2022-08-06 DIAGNOSIS — F41.9 ANXIETY: ICD-10-CM

## 2022-08-09 RX ORDER — SERTRALINE HYDROCHLORIDE 100 MG/1
TABLET, FILM COATED ORAL
Qty: 90 TABLET | Refills: 0 | Status: SHIPPED | OUTPATIENT
Start: 2022-08-09 | End: 2022-10-03

## 2022-09-03 ENCOUNTER — HEALTH MAINTENANCE LETTER (OUTPATIENT)
Age: 37
End: 2022-09-03

## 2022-10-03 DIAGNOSIS — F33.0 MILD RECURRENT MAJOR DEPRESSION (H): ICD-10-CM

## 2022-10-03 DIAGNOSIS — F41.9 ANXIETY: ICD-10-CM

## 2022-10-03 NOTE — LETTER
October 4, 2022    David Diaz  [address] [address]          Dear David,       We recently received a refill request  forsertraline (ZOLOFT) 100 MG tablet .  We have refilled this for a one time 90 day supply only because you are due for a:    Follow - Up office visit      Please call at your earliest convenience so that there will not be a delay with your future refills.          Thank you,   Your [facility] Team/  [phone]

## 2022-10-04 RX ORDER — SERTRALINE HYDROCHLORIDE 100 MG/1
100 TABLET, FILM COATED ORAL DAILY
Qty: 90 TABLET | Refills: 0 | Status: SHIPPED | OUTPATIENT
Start: 2022-10-04 | End: 2023-01-19

## 2023-01-02 DIAGNOSIS — F41.9 ANXIETY: ICD-10-CM

## 2023-01-02 DIAGNOSIS — F33.0 MILD RECURRENT MAJOR DEPRESSION (H): ICD-10-CM

## 2023-01-03 RX ORDER — SERTRALINE HYDROCHLORIDE 100 MG/1
TABLET, FILM COATED ORAL
Qty: 90 TABLET | Refills: 0 | OUTPATIENT
Start: 2023-01-03

## 2023-04-23 ENCOUNTER — HEALTH MAINTENANCE LETTER (OUTPATIENT)
Age: 38
End: 2023-04-23

## 2023-04-26 DIAGNOSIS — F41.9 ANXIETY: ICD-10-CM

## 2023-04-26 DIAGNOSIS — F33.0 MILD RECURRENT MAJOR DEPRESSION (H): ICD-10-CM

## 2023-04-26 RX ORDER — SERTRALINE HYDROCHLORIDE 100 MG/1
TABLET, FILM COATED ORAL
Qty: 90 TABLET | Refills: 0 | Status: SHIPPED | OUTPATIENT
Start: 2023-04-26

## 2023-04-26 NOTE — LETTER
April 26, 2023    David Diaz  [address] [address]    Dear David,       We recently received a refill request for sertraline (ZOLOFT) 100 MG tablet.  We have refilled this for a one time 90 day supply only because you are due for a:    Medication Check office visit      Please call at your earliest convenience so that there will not be a delay with your future refills.          Thank you,   Your [facility] Care Team/[place]  [phone]

## 2024-04-27 ENCOUNTER — HEALTH MAINTENANCE LETTER (OUTPATIENT)
Age: 39
End: 2024-04-27

## 2025-05-11 ENCOUNTER — HEALTH MAINTENANCE LETTER (OUTPATIENT)
Age: 40
End: 2025-05-11